# Patient Record
(demographics unavailable — no encounter records)

---

## 2025-01-16 NOTE — PHYSICAL EXAM
[Abdomen Masses] : No abdominal masses [Abdomen Tenderness] : ~T No ~M abdominal tenderness [Exam Deferred] : exam was deferred [JVD] : no jugular venous distention  [Thyroid] : the thyroid was abnormal [Normal Breath Sounds] : Normal breath sounds [Wheezing] : no wheezing was heard [Normal Heart Sounds] : normal heart sounds [Normal Rate and Rhythm] : normal rate and rhythm [No Rash or Lesion] : No rash or lesion [Purpura] : no purpura  [Petechiae] : no petechiae [Skin Ulcer] : no ulcer [Skin Induration] : no induration [Alert] : alert [Oriented to Person] : oriented to person [Oriented to Place] : oriented to place [Oriented to Time] : oriented to time [Calm] : calm [de-identified] : benign [de-identified] : NAD [de-identified] : CHARLES WEBB [de-identified] : FROM

## 2025-01-16 NOTE — REVIEW OF SYSTEMS
[Fever] : no fever [As noted in HPI] : as noted in HPI [As Noted in HPI] : as noted in HPI [Negative] : Heme/Lymph

## 2025-01-16 NOTE — HISTORY OF PRESENT ILLNESS
[FreeTextEntry1] : This is a 63-year-old male with for 9 prostatic hyperplasia hyperlipidemia history of pulmonary embolism (not currently on anticoagulation) hypertension, and low testosterone.  He is here today for consultation regarding screening colonoscopy.  He has no concerning symptoms.  His last colonoscopy was over 10 years ago.

## 2025-03-06 NOTE — ASSESSMENT
[FreeTextEntry1] : Shortness of breath on moderate to severe activity we will get an exercise stress echo because of an underlying abnormal electrocardiogram to rule out ischemia and an echocardiogram.  Pure hypertriglyceridemia he is educated on reducing carbohydrate intake and alcohol and to repeat the lipid profile in 2 months to see if there is any change also educated on reducing carbohydrate intake.  Hypertension uncontrolled with an abnormal EKG is consistent with left ventricular hypertrophy patient educated on that we will discontinue his combination pill and start him on amlodipine 5 mg one 1 tablet at night daily and will repeat and follow-up his blood pressure to see efficacy and rule out any intolerance.

## 2025-03-06 NOTE — REVIEW OF SYSTEMS
[Blurry Vision] : blurred vision [Chest Discomfort] : chest discomfort [Lower Ext Edema] : lower extremity edema [Erectile Dysfunction] : erectile dysfunction [Negative] : Psychiatric [SOB] : no shortness of breath [Palpitations] : no palpitations

## 2025-03-06 NOTE — CARDIOLOGY SUMMARY
[de-identified] : EKG done today reviewed by me on 3/6/2025 reveals normal sinus rhythm at 62 bpm left ventricular hypertrophy by voltage which was not seen in previous tracing.

## 2025-03-06 NOTE — PHYSICAL EXAM
[Well Developed] : well developed [No Acute Distress] : no acute distress [Normal Conjunctiva] : normal conjunctiva [Normal Venous Pressure] : normal venous pressure [No Carotid Bruit] : no carotid bruit [Normal S1, S2] : normal S1, S2 [S4] : S4 [Murmur] : murmur [Clear Lung Fields] : clear lung fields [Soft] : abdomen soft [Normal Gait] : normal gait [No Edema] : no edema [No Cyanosis] : no cyanosis [Normal Radial B/L] : normal radial B/L [Normal PT B/L] : normal PT B/L [Moves all extremities] : moves all extremities [Alert and Oriented] : alert and oriented [de-identified] : esm

## 2025-04-14 NOTE — REVIEW OF SYSTEMS
[Fever] : no fever [Headache] : no headache [Feeling Fatigued] : not feeling fatigued [Blurry Vision] : no blurred vision [Seeing Double (Diplopia)] : no diplopia [SOB] : no shortness of breath [Dyspnea on exertion] : not dyspnea during exertion [Chest Discomfort] : no chest discomfort [Lower Ext Edema] : no extremity edema [Leg Claudication] : no intermittent leg claudication [Palpitations] : no palpitations [Orthopnea] : no orthopnea [PND] : no PND [Syncope] : no syncope [Erectile Dysfunction] : erectile dysfunction [Joint Pain] : no joint pain [Rash] : no rash [Negative] : Heme/Lymph

## 2025-04-14 NOTE — PHYSICAL EXAM
[Well Developed] : well developed [Well Nourished] : well nourished [Normal Conjunctiva] : normal conjunctiva [Normal Venous Pressure] : normal venous pressure [No Carotid Bruit] : no carotid bruit [Normal S1, S2] : normal S1, S2 [S4] : S4 [Clear Lung Fields] : clear lung fields [Good Air Entry] : good air entry [Normal] : alert and oriented, normal memory

## 2025-04-14 NOTE — HISTORY OF PRESENT ILLNESS
[FreeTextEntry1] : 63-year-old male with history of hypertension and hypertensive cardiovascular disease recently started on amlodipine and metoprolol claims that he feels well on this combination of medication and his blood pressure at home is usually 130/80.  He did not take his medication for 2 days because  he thought  to hold for blood test and a visit.  No chest pain no shortness of breath no palpitations no syncope no claudication.

## 2025-04-14 NOTE — CARDIOLOGY SUMMARY
[de-identified] :  EKG done today reviewed by me on 3/6/2025 reveals normal sinus rhythm at 62 bpm left ventricular hypertrophy by voltage which was not seen in previous tracing. [de-identified] : 3/18/2025 CONCLUSIONS 1. Normal exercise stress echocardiogram. with normal augmentation of left ventricular systolic function. 2. Equivocal exercise stress test. 3. Baseline left ventricular cavity size was normal. Immediate post-stress, the left ventricular cavity size  was decreased in size. 4. Normal left ventricular segmental wall motion and systolic function at rest. normal left ventricular  segmental wall motion and systolic function immediate post-stress. 5. No prior echocardiogram is available for comparison. 6. The patient underwent stress testing using the Standard Don protocol.  - The patient exercised for 10 min 30 sec.  - The patient achieved 12.1 METs which is consistent with good exercise capacity. 7. The electrocardiogram is non-diagnostic due to the presence of significant ST segment abnormalities  at rest. [de-identified] : 3/18/2025 CONCLUSIONS: 1. Left ventricular systolic function is normal with an ejection fraction of 71 % by Gutierrez's method of  disks. 2. Left ventricular global longitudinal strain is -17.1 % is borderline (range: -18 to -16%). Images were  acquired on a [employer] ultrasound system and processed on the ultrasound machine with a heart rate of 70  bpm and a blood pressure of 176/90 mmHg. 3. Mild left ventricular hypertrophy. 4. Left atrium is moderately dilated. 5. Mild mitral regurgitation. 6. Mild tricuspid regurgitation. 7. Estimated pulmonary artery systolic pressure is 31 mmHg, consistent with mild pulmonary  hypertension. 8. No prior echocardiogram is available for comparison.

## 2025-04-14 NOTE — ASSESSMENT
[FreeTextEntry1] : Hypertension controlled continue current medications advised to take his medications regardless of whether he is having a blood pressure test or not.  Mixed hyperlipidemia is currently on atorvastatin we will repeat lipids to evaluate efficacy.  Shortness of breath on exertion this has resolved he is watching his diet as well advised to continue moderate exercise activity.

## 2025-07-07 NOTE — ASSESSMENT
[FreeTextEntry1] : Shortness of breath on exertion has improved advised to educate continue exercise and and continue medications.  Mixed hyperlipidemia educated on taking his medications and having a low carbohydrate diet advised to have repeat lab lipids performed when he sees his PCP next and forward me the results.  Hypertension controlled continue current medication.

## 2025-07-07 NOTE — CARDIOLOGY SUMMARY
[de-identified] : Electrocardiogram reviewed by me done on 7/7/2025 reveals normal sinus rhythm at 60 bpm normal QRS T frontal plane axis within normal limits. [de-identified] :  3/18/2025 CONCLUSIONS 1. Normal exercise stress echocardiogram. with normal augmentation of left ventricular systolic function. 2. Equivocal exercise stress test. 3. Baseline left ventricular cavity size was normal. Immediate post-stress, the left ventricular cavity size was decreased in size. 4. Normal left ventricular segmental wall motion and systolic function at rest. normal left ventricular segmental wall motion and systolic function immediate post-stress. 5. No prior echocardiogram is available for comparison. 6. The patient underwent stress testing using the Standard Don protocol.  - The patient exercised for 10 min 30 sec.  - The patient achieved 12.1 METs which is consistent with good exercise capacity. 7. The electrocardiogram is non-diagnostic due to the presence of significant ST segment abnormalities at rest.   [de-identified] : 3/18/2025 CONCLUSIONS: 1. Left ventricular systolic function is normal with an ejection fraction of 71 % by Gutierrez's method of disks. 2. Left ventricular global longitudinal strain is -17.1 % is borderline (range: -18 to -16%). Images were acquired on a [employer] ultrasound system and processed on the ultrasound machine with a heart rate of 70 bpm and a blood pressure of 176/90 mmHg. 3. Mild left ventricular hypertrophy. 4. Left atrium is moderately dilated. 5. Mild mitral regurgitation. 6. Mild tricuspid regurgitation. 7. Estimated pulmonary artery systolic pressure is 31 mmHg, consistent with mild pulmonary hypertension. 8. No prior echocardiogram is available for comparison

## 2025-07-07 NOTE — PHYSICAL EXAM
[Well Developed] : well developed [Well Nourished] : well nourished [Normal Conjunctiva] : normal conjunctiva [Normal Venous Pressure] : normal venous pressure [No Carotid Bruit] : no carotid bruit [Normal S1, S2] : normal S1, S2 [S4] : S4 [Clear Lung Fields] : clear lung fields [Good Air Entry] : good air entry [Soft] : abdomen soft [Non Tender] : non-tender [Normal Gait] : normal gait [No Edema] : no edema [Normal Radial B/L] : normal radial B/L [Normal PT B/L] : normal PT B/L [Normal] : alert and oriented, normal memory

## 2025-07-07 NOTE — REASON FOR VISIT
[Symptom and Test Evaluation] : symptom and test evaluation [Hyperlipidemia] : hyperlipidemia [Hypertension] : hypertension [FreeTextEntry1] : 63-year-old male history of hypertension hyperlipidemia who has  been taking his medications regularly claims to be feeling well, has mild tingling with hyperesthesia of his left lower extremities usually positional at times.  His occupation is working as a .  There is no syncope there is no severe headaches there is no pain in the neck or any lower back pain.  Has no history of claudication there is no chest pain there is no dyspnea on exertion.

## 2025-07-07 NOTE — REVIEW OF SYSTEMS
[Numbness (Hypoesthesia)] : numbness [Tingling (Paresthesia)] : tingling [Negative] : Heme/Lymph [SOB] : no shortness of breath [Dyspnea on exertion] : not dyspnea during exertion [Chest Discomfort] : no chest discomfort [Lower Ext Edema] : no extremity edema [Leg Claudication] : no intermittent leg claudication [Palpitations] : no palpitations [Orthopnea] : no orthopnea [PND] : no PND [Syncope] : no syncope [FreeTextEntry9] : LLE tingling and hyperesthesia at times

## 2025-07-11 NOTE — PHYSICAL EXAM
[de-identified] :   Patient is well nourished, well-developed, in no acute distress, with appropriate mood and affect. The patient is oriented to time, place, and person. Respirations are even and unlabored. Gait evaluation does not reveal a limp. There is no inguinal adenopathy. Examination of the contralateral knee shows normal range of motion, strength, no tenderness, and intact skin. The affected limb is well-perfused and showed 2+ dp/pt pulses, without skin lesions, shows a grossly normal motor and sensory examination. Knee motion is painless and the knee moves from 0 to 135 degrees. The knee is stable within that range-of-motion to AP and ML stress with a 1A Lachman, negative anterior or posterior drawer and no instability to varus or valgus stress. The alignment of the knee is 5 degrees varus. No effusion or crepitus is noted. No tenderness to palpation about the medial or lateral joint line, medial or lateral tibial plateau, medial or lateral femoral condyle, medial or lateral patellar facets, superior or inferior pole of the patella. Hina's is negative and Thessaly is negative. Muscle strength is normal. Pedal pulses are palpable. Hip examination was negative. [de-identified] : Long standing knee, AP knee, lateral knee, and patellar views of the right knee were ordered and taken in the office and demonstrate no evidence of degenerative joint disease of the knee, fractures, intra-articular pathology.

## 2025-07-11 NOTE — HISTORY OF PRESENT ILLNESS
[de-identified] : This is very nice 63-year-old gentleman experiencing right knee pain, which is mild in intensity.  Pain mainly associated with playing cricket.  The pain does not limits activities of daily living. Walking tolerance is not reduced.  He does not take NSAIDs for this.  He does not use a cane or walker.  He sometimes ices it which helps.  The patient denies any radiation of the pain to the feet and it is not associated with numbness, tingling, or weakness.

## 2025-07-11 NOTE — DISCUSSION/SUMMARY
[de-identified] : This patient has right knee pain.  The patient is not an appropriate candidate for surgical intervention at this time. An extensive discussion was conducted on the natural history of the disease and the variety of surgical and non-surgical options available to the patient including, but not limited to non-steroidal anti-inflammatory medications, steroid injections, physical therapy, maintenance of ideal body weight, and reduction of activity.  Recommend over-the-counter NSAIDs and a home exercise program.  Recommend to try neoprene sleeve knee brace. The patient will schedule an appointment as needed.